# Patient Record
Sex: FEMALE | Race: ASIAN
[De-identification: names, ages, dates, MRNs, and addresses within clinical notes are randomized per-mention and may not be internally consistent; named-entity substitution may affect disease eponyms.]

---

## 2020-01-09 ENCOUNTER — HOSPITAL ENCOUNTER (INPATIENT)
Dept: HOSPITAL 74 - JLDR | Age: 30
LOS: 3 days | Discharge: HOME | End: 2020-01-12
Attending: OBSTETRICS & GYNECOLOGY | Admitting: OBSTETRICS & GYNECOLOGY
Payer: COMMERCIAL

## 2020-01-09 VITALS — BODY MASS INDEX: 29.6 KG/M2

## 2020-01-09 DIAGNOSIS — Z3A.41: ICD-10-CM

## 2020-01-09 DIAGNOSIS — O48.0: Primary | ICD-10-CM

## 2020-01-09 LAB
ANION GAP SERPL CALC-SCNC: 8 MMOL/L (ref 8–16)
APTT BLD: 28.5 SECONDS (ref 25.2–36.5)
BASOPHILS # BLD: 0.3 % (ref 0–2)
BUN SERPL-MCNC: 4.8 MG/DL (ref 7–18)
CALCIUM SERPL-MCNC: 8.6 MG/DL (ref 8.5–10.1)
CHLORIDE SERPL-SCNC: 107 MMOL/L (ref 98–107)
CO2 SERPL-SCNC: 22 MMOL/L (ref 21–32)
CREAT SERPL-MCNC: 0.6 MG/DL (ref 0.55–1.3)
DEPRECATED RDW RBC AUTO: 18.7 % (ref 11.6–15.6)
EOSINOPHIL # BLD: 1.4 % (ref 0–4.5)
GLUCOSE SERPL-MCNC: 97 MG/DL (ref 74–106)
HCT VFR BLD CALC: 37.5 % (ref 32.4–45.2)
HGB BLD-MCNC: 12.6 GM/DL (ref 10.7–15.3)
INR BLD: 1.05 (ref 0.83–1.09)
LYMPHOCYTES # BLD: 25.3 % (ref 8–40)
MCH RBC QN AUTO: 28.4 PG (ref 25.7–33.7)
MCHC RBC AUTO-ENTMCNC: 33.6 G/DL (ref 32–36)
MCV RBC: 84.4 FL (ref 80–96)
MONOCYTES # BLD AUTO: 9 % (ref 3.8–10.2)
NEUTROPHILS # BLD: 64 % (ref 42.8–82.8)
PLATELET # BLD AUTO: 228 K/MM3 (ref 134–434)
PMV BLD: 7.4 FL (ref 7.5–11.1)
POTASSIUM SERPLBLD-SCNC: 3.6 MMOL/L (ref 3.5–5.1)
PT PNL PPP: 12.4 SEC (ref 9.7–13)
RBC # BLD AUTO: 4.44 M/MM3 (ref 3.6–5.2)
SODIUM SERPL-SCNC: 137 MMOL/L (ref 136–145)
WBC # BLD AUTO: 5.5 K/MM3 (ref 4–10)

## 2020-01-09 PROCEDURE — 3E0P7VZ INTRODUCTION OF HORMONE INTO FEMALE REPRODUCTIVE, VIA NATURAL OR ARTIFICIAL OPENING: ICD-10-PCS | Performed by: OBSTETRICS & GYNECOLOGY

## 2020-01-09 RX ADMIN — SODIUM CHLORIDE, SODIUM GLUCONATE, SODIUM ACETATE, POTASSIUM CHLORIDE, AND MAGNESIUM CHLORIDE SCH MLS/HR: 526; 502; 368; 37; 30 INJECTION, SOLUTION INTRAVENOUS at 20:45

## 2020-01-09 NOTE — HP
Past Medical History





- Primary Care Physician


PCP:: Scooter Pires





- Admission


Chief Complaint: pregnancy 41 weeks, for induction of labor


History of Present Illness: 





28 yo f  0 1 0 41 weeks admitted for cervidil induction of labor , cx ft, 

25 vx -3 mi, fhr cat 1, no contraction, cervidil risks has explained to patient


History Source: Patient


Limitations to Obtaining History: No Limitations





- Past Medical History


...: 2


...Para: 0


...Term: 0


...: 0


...Spon : 1


...Induced : 0


...Multiple Gestation: 0


...LMP: 19


... Weeks Gestation by Dates: 41.2


...EDC by Dates: 19


...EDC by Sono: 20





- Past Surgical History


Hx Myomectomy: No


Hx Transabdominal Cerclage: No





- Smoking History


Smoking history: Never smoked


Have you smoked in the past 12 months: No





- Alcohol/Substance Use


Hx Alcohol Use: No





- Social History


Usual Living Arrangement: Yes: With Spouse


History of Recent Travel: No





Home Medications





- Allergies


Allergies/Adverse Reactions: 


 Allergies











Allergy/AdvReac Type Severity Reaction Status Date / Time


 


nitrofurantoin Allergy Severe Difficulty Verified 19 14:19





[From Macrobid]   Breathing  














- Home Medications


Home Medications: 


Ambulatory Orders





Pnv No.95/Ferrous Fum/Folic AC [Prenatal Formula] 1 each PO DAILY 19 











Review of Systems





- Review of Systems


Constitutional: reports: No Symptoms


Eyes: reports: No Symptoms


HENT: reports: No Symptoms


Neck: reports: No Symptoms


Cardiovascular: reports: No Symptoms


Respiratory: reports: No Symptoms


Gastrointestinal: reports: No Symptoms


Genitourinary: reports: No Symptoms


Breasts: reports: No Symptoms Reported


Musculoskeletal: reports: No Symptoms


Integumentary: reports: No Symptoms


Neurological: reports: No Symptoms


Endocrine: reports: No Symptoms


Hematology/Lymphatic: reports: No Symptoms


Psychiatric: reports: No Symptoms





Physical Exam - Maternity


Vital Signs: 


 Vital Signs











Temperature  98.0 F   20 18:00


 


Pulse Rate  70   20 20:00


 


Respiratory Rate  20   20 20:00


 


Blood Pressure  111/60   20 20:00


 


O2 Sat by Pulse Oximetry (%)      











Constitutional: Yes: Well Nourished, No Distress, Calm


Eyes: Yes: WNL, Conjunctiva Clear, EOM Intact


HENT: Yes: WNL, Atraumatic, Normocephalic


Neck: Yes: WNL, Supple, Trachea Midline


Cardiovascular: Yes: WNL, Regular Rate and Rhythm


Breast(s): Yes: WNL





- Abdominal Exam/OB


Fundal Height: 40


Number of Fetuses: Single


Fetal Presentation: Vertex


Contractions: No


Intensity: Unaware


Fetal Monitor Mode: External


Fetal Heart Rate Location: [facility]


Category: I


Accelerations: Non-Uniform


Decelerations: None





- Vaginal Exam/OB


Vaginal Bleediing: No


Speculum Exam: No


Dilatation (cm): 1


Effacement (%): 25


Amniotic Membrane Status: Intact


Fetal Presentation: Vertex/Position


Fetal Station: -3





- Physical Exam


Musculoskeletal: Yes: WNL


Extremities: Yes: WNL


Edema: LLE: Trace, RLE: Trace


Deep Tendon Reflex Grade: Normal +2


Psychiatric: Yes: Alert





- Labs


Lab Results: 


 CBC, BMP





 20 09:00 





 20 09:00 











Hemorrhage Risk Assessment





- Risk Factors


Medium Risk Factors: Yes: None


High Risk Factors: Yes: None


Risk Score: 1


Risk Level: Medium Risk





Problem List





- Problems


(1) Post-dates pregnancy


Code(s): O48.0 - POST-TERM PREGNANCY   


Qualifiers: 


   Post-term pregnancy type: 40-42 weeks gestation   Qualified Code(s): O48.0 - 

Post-term pregnancy   





(2) Post-dates pregnancy


Code(s): O48.0 - POST-TERM PREGNANCY   





(3) Encounter for induction of labor


Code(s): Z34.90 - ENCNTR FOR SUPRVSN OF NORMAL PREGNANCY, UNSP, UNSP TRIMESTER 

  





Assessment/Plan





admit for cervidil induction


fhm


GBS positive ,will start when in labor

## 2020-01-09 NOTE — PN
Progress Note (short form)





- Note


Progress Note: 





vcx 2 cm 70 vx -3, fhr cat 1, regular contraction q 2 min , cervidil removed, 


will start GBS prophylaxis





Problem List





- Problems


(1) Post-dates pregnancy


Code(s): O48.0 - POST-TERM PREGNANCY   


Qualifiers: 


   Post-term pregnancy type: 40-42 weeks gestation   Qualified Code(s): O48.0 - 

Post-term pregnancy   





(2) Post-dates pregnancy


Code(s): O48.0 - POST-TERM PREGNANCY   





(3) Encounter for induction of labor


Code(s): Z34.90 - ENCNTR FOR SUPRVSN OF NORMAL PREGNANCY, UNSP, UNSP TRIMESTER

## 2020-01-10 LAB — PH SPEC: (no result) [PH]

## 2020-01-10 RX ADMIN — Medication SCH MLS/HR: at 18:00

## 2020-01-10 RX ADMIN — AMPICILLIN SCH MLS/HR: 1 INJECTION, POWDER, FOR SOLUTION INTRAMUSCULAR; INTRAVENOUS at 09:00

## 2020-01-10 RX ADMIN — ACETAMINOPHEN PRN MG: 325 TABLET ORAL at 23:20

## 2020-01-10 RX ADMIN — FERROUS SULFATE TAB EC 324 MG (65 MG FE EQUIVALENT) SCH MG: 324 (65 FE) TABLET DELAYED RESPONSE at 17:02

## 2020-01-10 RX ADMIN — SODIUM CHLORIDE, SODIUM GLUCONATE, SODIUM ACETATE, POTASSIUM CHLORIDE, AND MAGNESIUM CHLORIDE SCH MLS/HR: 526; 502; 368; 37; 30 INJECTION, SOLUTION INTRAVENOUS at 09:35

## 2020-01-10 RX ADMIN — AMPICILLIN SCH MLS/HR: 1 INJECTION, POWDER, FOR SOLUTION INTRAMUSCULAR; INTRAVENOUS at 05:00

## 2020-01-10 RX ADMIN — Medication SCH MLS/HR: at 12:10

## 2020-01-10 RX ADMIN — IBUPROFEN PRN MG: 600 TABLET, FILM COATED ORAL at 23:20

## 2020-01-10 NOTE — PN
Progress Note (short form)





- Note


Progress Note: 





cx 7 cm ,100 vx -1 AROM, clear, FHR cat 1





Problem List





- Problems


(1) Post-dates pregnancy


Code(s): O48.0 - POST-TERM PREGNANCY   


Qualifiers: 


   Post-term pregnancy type: 40-42 weeks gestation   Qualified Code(s): O48.0 - 

Post-term pregnancy   





(2) Post-dates pregnancy


Code(s): O48.0 - POST-TERM PREGNANCY   





(3) Encounter for induction of labor


Code(s): Z34.90 - ENCNTR FOR SUPRVSN OF NORMAL PREGNANCY, UNSP, UNSP TRIMESTER

## 2020-01-10 NOTE — PN
Progress Note (short form)





- Note


Progress Note: 





full 100 vx 2+ station , fhr cat 2, pushing





Problem List





- Problems


(1) Post-dates pregnancy


Code(s): O48.0 - POST-TERM PREGNANCY   


Qualifiers: 


   Post-term pregnancy type: 40-42 weeks gestation   Qualified Code(s): O48.0 - 

Post-term pregnancy   





(2) Post-dates pregnancy


Code(s): O48.0 - POST-TERM PREGNANCY   





(3) Encounter for induction of labor


Code(s): Z34.90 - ENCNTR FOR SUPRVSN OF NORMAL PREGNANCY, UNSP, UNSP TRIMESTER

## 2020-01-10 NOTE — PN
Progress Note (short form)





- Note


Progress Note: 





cx 6 cm , 80 vx , -2 mi, fhr cat 1contraction regular





Problem List





- Problems


(1) Post-dates pregnancy


Code(s): O48.0 - POST-TERM PREGNANCY   


Qualifiers: 


   Post-term pregnancy type: 40-42 weeks gestation   Qualified Code(s): O48.0 - 

Post-term pregnancy   





(2) Post-dates pregnancy


Code(s): O48.0 - POST-TERM PREGNANCY   





(3) Encounter for induction of labor


Code(s): Z34.90 - ENCNTR FOR SUPRVSN OF NORMAL PREGNANCY, UNSP, UNSP TRIMESTER

## 2020-01-10 NOTE — PN
Delivery





- Delivery


Vaginal Delivery: Spontaneous (cx fullydilated head delivered ,juan carlos, ,ant. post 

shoulder with nodifficulty, live baby girl , apgar 9/9, placenta complete , 

median episiotomy in 3 layers with 2.0 chromic  repaired wit 2.0 chromic , ebl 

300cc , baby bonded with mother and breast fed no complicatin)


Type of Anesthesia: Local, Epidural


Episiotomy/Laceration: Midline


EBL (cc): 300





Delivery, Single Birth





- Stages of Labor


Date 1st Stage Initiatied: 01/10/20


Time 1st Stage Initiated: 04:00


Date 2nd Stage Initiated: 01/10/20


Time 2nd Stage Initiated: 10:45


Date of Delivery: 01/10/20


Time of Delivery: 12:06


Time Placenta Delivered: 12:10





- Condition of Infant


Pediatrician/Neonatologist Present: Yes


Name: Claudine Castrejon


Infant Gender: Female


Birth Weight: 7 lb 8 oz


Position: Left, OA


Total Hours ROM (Hrs/Mins): 7/40





- Apgar


  ** 1 Minute


Apgar Total Score: 9





  ** 5 Minutes


Apgar Total Score: 9





- [place] Feeding Plan


Initial Plan: Exclusive breastfeeding throughout hospitalization

## 2020-01-10 NOTE — PN
Progress Note (short form)





- Note


Progress Note: 





cx 9 cm , 1oo vx -1 mr, fhr cat 1, regullar contraction, wants top off





Problem List





- Problems


(1) Post-dates pregnancy


Code(s): O48.0 - POST-TERM PREGNANCY   


Qualifiers: 


   Post-term pregnancy type: 40-42 weeks gestation   Qualified Code(s): O48.0 - 

Post-term pregnancy   





(2) Post-dates pregnancy


Code(s): O48.0 - POST-TERM PREGNANCY   





(3) Encounter for induction of labor


Code(s): Z34.90 - ENCNTR FOR SUPRVSN OF NORMAL PREGNANCY, UNSP, UNSP TRIMESTER

## 2020-01-11 LAB
BASOPHILS # BLD: 0.3 % (ref 0–2)
DEPRECATED RDW RBC AUTO: 19 % (ref 11.6–15.6)
EOSINOPHIL # BLD: 1.5 % (ref 0–4.5)
HCT VFR BLD CALC: 25.1 % (ref 32.4–45.2)
HGB BLD-MCNC: 8.3 GM/DL (ref 10.7–15.3)
LYMPHOCYTES # BLD: 23.6 % (ref 8–40)
MCH RBC QN AUTO: 28.5 PG (ref 25.7–33.7)
MCHC RBC AUTO-ENTMCNC: 33.2 G/DL (ref 32–36)
MCV RBC: 85.9 FL (ref 80–96)
MONOCYTES # BLD AUTO: 7.2 % (ref 3.8–10.2)
NEUTROPHILS # BLD: 67.4 % (ref 42.8–82.8)
PLATELET # BLD AUTO: 168 K/MM3 (ref 134–434)
PMV BLD: 7.3 FL (ref 7.5–11.1)
RBC # BLD AUTO: 2.92 M/MM3 (ref 3.6–5.2)
WBC # BLD AUTO: 9.7 K/MM3 (ref 4–10)

## 2020-01-11 RX ADMIN — AMPICILLIN SCH: 1 INJECTION, POWDER, FOR SOLUTION INTRAMUSCULAR; INTRAVENOUS at 02:04

## 2020-01-11 RX ADMIN — FERROUS SULFATE TAB EC 324 MG (65 MG FE EQUIVALENT) SCH MG: 324 (65 FE) TABLET DELAYED RESPONSE at 08:00

## 2020-01-11 RX ADMIN — FERROUS SULFATE TAB EC 324 MG (65 MG FE EQUIVALENT) SCH MG: 324 (65 FE) TABLET DELAYED RESPONSE at 17:57

## 2020-01-11 RX ADMIN — IBUPROFEN PRN MG: 600 TABLET, FILM COATED ORAL at 22:05

## 2020-01-11 RX ADMIN — Medication SCH TAB: at 09:57

## 2020-01-11 RX ADMIN — ACETAMINOPHEN PRN MG: 325 TABLET ORAL at 22:06

## 2020-01-11 NOTE — PN
Progress Note (short form)





- Note


Progress Note: 





ppd 1 s/p  , doing well, no excess vaginal bleeding, ambulating, no 

dizziness


 CBC, BMP





 20 06:39 





 20 09:00 





 Last Vital Signs











Temp Pulse Resp BP Pulse Ox


 


 98.2 F   73   18   92/52 L  98 


 


 20 05:48  20 05:48  20 05:48  20 05:48  01/10/20 11:45








abdomen soft, no CVA, uterus firm, non tender 


lochia mild 


no calf tenderness 


impression anemia , asymptomatic, no active vaginal bleeding 


plan iron, vit 





Problem List





- Problems


(1) Post-dates pregnancy


Code(s): O48.0 - POST-TERM PREGNANCY   


Qualifiers: 


   Post-term pregnancy type: 40-42 weeks gestation   Qualified Code(s): O48.0 - 

Post-term pregnancy   





(2) Post-dates pregnancy


Code(s): O48.0 - POST-TERM PREGNANCY   





(3) Encounter for induction of labor


Code(s): Z34.90 - ENCNTR FOR SUPRVSN OF NORMAL PREGNANCY, UNSP, UNSP TRIMESTER

## 2020-01-12 VITALS — DIASTOLIC BLOOD PRESSURE: 64 MMHG | HEART RATE: 76 BPM | TEMPERATURE: 98.4 F | SYSTOLIC BLOOD PRESSURE: 110 MMHG

## 2020-01-12 RX ADMIN — FERROUS SULFATE TAB EC 324 MG (65 MG FE EQUIVALENT) SCH MG: 324 (65 FE) TABLET DELAYED RESPONSE at 10:54

## 2020-01-12 RX ADMIN — Medication SCH TAB: at 10:54

## 2020-01-12 NOTE — DS
Physical Exam-GYN


Vital Signs: 


 Vital Signs











Temperature  98.1 F   20 22:00


 


Pulse Rate  78   20 22:00


 


Respiratory Rate  18   20 22:00


 


Blood Pressure  106/67   20 22:00


 


O2 Sat by Pulse Oximetry (%)  98   01/10/20 11:45











Constitutional: Yes: Well Nourished, No Distress, Calm


Eyes: Yes: WNL, Conjunctiva Clear, EOM Intact


HENT: Yes: WNL, Atraumatic, Normocephalic


Neck: Yes: WNL, Supple, Trachea Midline


Cardiovascular: Yes: WNL, Regular Rate and Rhythm


Respiratory: Yes: WNL, Regular, CTA Bilaterally


Gastrointestinal: Yes: WNL


...Rectal Exam: Yes: WNL


Renal/: Yes: WNL


....Post Partum: Yes: Uterus firm, Uterus non-tender, Slight lochia rubra


Breast(s): Yes: WNL


Musculoskeletal: Yes: WNL


Extremities: Yes: WNL


Edema: No


Integumentary: Yes: WNL


Neurological: Yes: WNL, Alert, Oriented


...Motor Strength: WNL


Psychiatric: Yes: WNL, Alert, Oriented


Labs: 


 CBC, BMP





 20 06:39 





 20 09:00 











Delivery





- Delivery


Vaginal Delivery: Spontaneous (cx fullydilated head delivered ,juan carlos, ,ant. post 

shoulder with nodifficulty, live baby girl , apgar 9/9, placenta complete , 

median episiotomy in 3 layers with 2.0 chromic  repaired wit 2.0 chromic , ebl 

300cc , baby bonded with mother and breast fed no complicatin)


Type of Anesthesia: Local, Epidural


Episiotomy/Laceration: Midline


EBL (cc): 300





Delivery, Single Birth





- Stages of Labor


Date 1st Stage Initiatied: 01/10/20


Time 1st Stage Initiated: 04:00


Date 2nd Stage Initiated: 01/10/20


Time 2nd Stage Initiated: 10:45


Date of Delivery: 01/10/20


Time of Delivery: 12:06


Time Placenta Delivered: 12:10


Placenta: Yes: Spontaneous





- Condition of Infant


Pediatrician/Neonatologist Present: Yes


Name: Claudine Castrejon


Infant Gender: Female


Birth Weight: 7 lb 8 oz


Position: Left, OA


Total Hours ROM (Hrs/Mins): 7/40





- Apgar


  ** 1 Minute


Apgar Total Score: 9





  ** 5 Minutes


Apgar Total Score: 9





- [place] Feeding Plan


Initial Plan: Exclusive breastfeeding throughout hospitalization





Discharge Summary


Problems reviewed: Yes


Reason For Visit: INDUCTION OF LABOR


Current Active Problems





Encounter for induction of labor (Acute)


Post-dates pregnancy (Acute)


Post-dates pregnancy (Acute)








Procedures: Principal: 


Other Procedures: median episiotomy


Hospital Course: 


no complication


Health Concerns: 


anemia


Plan of Treatment: 


iron, vit


Goals: 


hb 12


Condition: Good





- Instructions


Diet, Activity, Other Instructions: 


regular diet, no intercourse , if pain, heavy vaginal bleeding, fever , 

dizziness call MD 


follow up office 4 weeks


Referrals: 


Scooter Pires MD [Staff Physician] - 


Disposition: HOME





- Home Medications


Comprehensive Discharge Medication List: 


Ambulatory Orders





Pnv No.95/Ferrous Fum/Folic AC [Prenatal Formula] 1 each PO DAILY 19 


Ibuprofen [Motrin -] 600 mg PO QID #28 tablet 20

## 2020-01-16 ENCOUNTER — HOSPITAL ENCOUNTER (EMERGENCY)
Dept: HOSPITAL 74 - JER | Age: 30
Discharge: HOME | End: 2020-01-16
Payer: COMMERCIAL

## 2020-01-16 VITALS — DIASTOLIC BLOOD PRESSURE: 91 MMHG | HEART RATE: 76 BPM | SYSTOLIC BLOOD PRESSURE: 115 MMHG

## 2020-01-16 VITALS — TEMPERATURE: 98.4 F

## 2020-01-16 VITALS — BODY MASS INDEX: 30.2 KG/M2

## 2020-01-16 DIAGNOSIS — R10.2: ICD-10-CM

## 2020-01-16 LAB
ALBUMIN SERPL-MCNC: 3 G/DL (ref 3.4–5)
ALP SERPL-CCNC: 171 U/L (ref 45–117)
ALT SERPL-CCNC: 43 U/L (ref 13–61)
ANION GAP SERPL CALC-SCNC: 7 MMOL/L (ref 8–16)
APTT BLD: 29.3 SECONDS (ref 25.2–36.5)
AST SERPL-CCNC: 36 U/L (ref 15–37)
BASOPHILS # BLD: 0.4 % (ref 0–2)
BILIRUB SERPL-MCNC: 0.3 MG/DL (ref 0.2–1)
BUN SERPL-MCNC: 15 MG/DL (ref 7–18)
CALCIUM SERPL-MCNC: 9.2 MG/DL (ref 8.5–10.1)
CHLORIDE SERPL-SCNC: 106 MMOL/L (ref 98–107)
CO2 SERPL-SCNC: 25 MMOL/L (ref 21–32)
CREAT SERPL-MCNC: 0.6 MG/DL (ref 0.55–1.3)
DEPRECATED RDW RBC AUTO: 18.8 % (ref 11.6–15.6)
EOSINOPHIL # BLD: 2.6 % (ref 0–4.5)
GLUCOSE SERPL-MCNC: 97 MG/DL (ref 74–106)
HCT VFR BLD CALC: 33.1 % (ref 32.4–45.2)
HGB BLD-MCNC: 11.1 GM/DL (ref 10.7–15.3)
INR BLD: 1.06 (ref 0.83–1.09)
LYMPHOCYTES # BLD: 21.6 % (ref 8–40)
MCH RBC QN AUTO: 28.7 PG (ref 25.7–33.7)
MCHC RBC AUTO-ENTMCNC: 33.6 G/DL (ref 32–36)
MCV RBC: 85.5 FL (ref 80–96)
MONOCYTES # BLD AUTO: 7.1 % (ref 3.8–10.2)
NEUTROPHILS # BLD: 68.3 % (ref 42.8–82.8)
PLATELET # BLD AUTO: 326 K/MM3 (ref 134–434)
PMV BLD: 7.3 FL (ref 7.5–11.1)
POTASSIUM SERPLBLD-SCNC: 4.4 MMOL/L (ref 3.5–5.1)
PROT SERPL-MCNC: 7 G/DL (ref 6.4–8.2)
PT PNL PPP: 12.5 SEC (ref 9.7–13)
RBC # BLD AUTO: 3.87 M/MM3 (ref 3.6–5.2)
SODIUM SERPL-SCNC: 138 MMOL/L (ref 136–145)
WBC # BLD AUTO: 7.3 K/MM3 (ref 4–10)

## 2020-01-16 PROCEDURE — 3E033NZ INTRODUCTION OF ANALGESICS, HYPNOTICS, SEDATIVES INTO PERIPHERAL VEIN, PERCUTANEOUS APPROACH: ICD-10-PCS

## 2020-01-16 PROCEDURE — 3E033GC INTRODUCTION OF OTHER THERAPEUTIC SUBSTANCE INTO PERIPHERAL VEIN, PERCUTANEOUS APPROACH: ICD-10-PCS

## 2020-01-16 NOTE — PDOC
Attending Attestation





- Resident


Resident Name: Joan Kay





- ED Attending Attestation


I have performed the following: I have examined & evaluated the patient, The 

case was reviewed & discussed with the resident, I agree w/resident's findings 

& plan





- HPI


HPI: 





01/16/20 04:27


see resident hpi





- Physicial Exam


PE: 





01/16/20 04:27


agree with resident exam





- Medical Decision Making





01/16/20 04:27


29-year-old female, 6 days postpartum status post normal spontaneous vaginal 

delivery requiring episiotomy now with sudden onset of severe uterine cramping 

and bleeding


This was immediately following breast-feeding


Case discussed with Dr. Pope covering for patient's OB/GYN who states 

symptoms likely due to oxytocin release


Hemoglobin and hematocrit are appropriately increased since delivery


Patient has been normotensive


After morphine 4 mg she is resting comfortably and has had no hemorrhage


There were some fresh clots at the vaginal opening


She was able to urinate without difficulty


Plan for DC with outpatient office follow-up


NSAIDs for pain

## 2020-01-16 NOTE — PDOC
History of Present Illness





- General


Stated Complaint: ABD PAIN


Time Seen by Provider: 01/16/20 02:31





- History of Present Illness


Initial Comments: 


Jeanen Greenberg is a 30yo otherwise healthy woman, currently 6 days postpartum s/p 

vaginal delivery who presents with acute onset of vaginal bleeding and severe 

abdominal pain. She states that she had been feeling well throughout the day, 

got up overnight and used the bathroom. Immediately afterward, she had sudden 

onset of severe lower abdominal pain and started to have vaginal bleeding with 

passage of two clots. Ms Greenberg is tearful and unable to provide much 

information. Her  and aunt are at bedside to provide additional 

information.








Past History





- Past Medical History


Allergies/Adverse Reactions: 


 Allergies











Allergy/AdvReac Type Severity Reaction Status Date / Time


 


nitrofurantoin Allergy Severe Difficulty Verified 01/16/20 03:26





[From Macrobid]   Breathing  











Home Medications: 


Ambulatory Orders





Pnv No.95/Ferrous Fum/Folic AC [Prenatal Formula] 1 each PO DAILY 09/26/19 


Ibuprofen [Motrin -] 600 mg PO QID #28 tablet 01/11/20 








Asthma: No


Cancer: No


Cardiac Disorders: No


Diabetes: No


HTN: No


Seizures: No


Thyroid Disease: No





- Psycho Social/Smoking Cessation Hx


Smoking History: Never smoked


Have you smoked in the past 12 months: No


Hx Alcohol Use: No


Drug/Substance Use Hx: No


Hx Substance Use Treatment: No





**Review of Systems





- Review of Systems


Comments:: 


Unable to provide


Per family, no complaints prior to tonight





*Physical Exam





- Physical Exam


General: Very uncomfortable. Vitals stable, no acute distress


HEENT: Atraumatic, PERRL, EOMI, MMM, voice normal


Cards: RRR, no murmur appreciated


Pulm: Comfortable on room air, clear to auscultation bilaterally


Abd: Soft, nondistended. No rigidity. Pt clutching lower abdomen but no 

involuntary guarding observed


: External vaginal exam with approx 15-20cc of dark red blood. Internal exam 

deferred due to episiotomy and recent delivery


Ext: Atraumatic. No LE edema. ROM intact. WWP


Skin: Normal color, no rashes or lesions


Neuro: A&Ox3, CN grossly intact, normal speech, motor/sensory grossly intact 

and symmetric


Psych: Upset, tearful











ED Treatment Course





- LABORATORY


CBC & Chemistry Diagram: 


 01/16/20 02:34





 01/16/20 02:34





Medical Decision Making





- Medical Decision Making





01/16/20 02:36


Jeanne Greenberg is a 30yo otherwise healthy woman, currently 6 days postpartum s/p 

vaginal delivery who presents with acute onset of vaginal bleeding and severe 

abdominal pain. She states that she had been feeling well throughout the day, 

got up overnight and used the bathroom. Immediately afterward, she had sudden 

onset of severe lower abdominal pain and started to have vaginal bleeding with 

passage of two clots. Ms Greenberg is tearful and unable to provide much 

information. Her  and aunt are at bedside to provide additional 

information.





- Dark red vaginal bleeding


- Pt clutching abdomen, resists exam. Appears to be in significant pain.


- CBC, CMP, coags, T&S


- IVF


- Morphine, zofran


- OB consult; pt sees dr Pires





01/16/20 03:40


- Call to Dr Pope, spoke to Dr Xie. Per request of Dr Pope, 

confirmed that pt  her baby immediately before the pain started. She 

reports that breastfeeding could have caused oxytocin release, stimulating a 

uterine contraction and expelling old blood from within the uterus. Would like 

to be called back with results. Agrees with hydration, checking h/h, pain 

control


- Pt now sleeping comfortably





01/16/20 03:49


- Labs reviewed. Hgb increassed from 8.3 following delivery on 1/10 to 11.1 

today. Appropraite increase. No concerning abnormalities


- No active bleeding, small amount of dark red blood noted


- Pt continues to sleep comfortably


- Will reassess.





01/16/20 05:13


- Pt declines acetaminiophen and toradol, states pain is nearly gone


- Spoke to Dr Pope again. Recommending alternating acetaminophen and 

ibuprofen at home


- Pt will call tomorrow to schedule follow up with Dr Pires








Seen with Dr Rojas Kay


PGY2





Discharge





- Discharge Information


Problems reviewed: Yes


Clinical Impression/Diagnosis: 


 Vaginal bleeding





Abdominal pain


Qualifiers:


 Abdominal location: lower abdomen, unspecified Qualified Code(s): R10.30 - 

Lower abdominal pain, unspecified





Condition: Stable


Disposition: HOME





- Admission


No





- Follow up/Referral


Referrals: 


Naomi Hodgson MD [Primary Care Provider] - 





- Patient Discharge Instructions


Additional Instructions: 


Discharge Instructions:


You were seen in the emergency department for abdominal pain and vaginal 

bleeding. This is likely due to normal post-pregnancy hormone production. The 

bleeding is probably blood that was inside the uterus being expelled by muscle 

contraction. 





Home Care and Follow Up:


- You may use over the counter medications as needed for pain at home. 650-

1000mg acetaminophen (Tylenol) or 600mg ibuprofen (Motrin or Advil) can be used 

every 6-8 hours. If needed for continued pain, these medications may be 

alternated every 3-4 hours. For example, if you take ibuprofen at 9am, you may 

take acetaminophen at noon, ibuprofen at 3pm, etc. 


- It is strongly recommended that you take ibuprofen with food to help prevent 

stomach irritation. 


- Try using an ice pack for 20 minutes every hour or a heating pad for 

additional pain control. These should NOT be used over the lidocaine patch, but 

you may place them over the areas of pain while the patch is off.


- Do not stop moving around. As much as you can tolerate, continue to do light 

exercise and stretching exercises. Increase your activity level as much as you 

can tolerate daily.


- Call your OB tomorrow to schedule a follow up appointment


- Seek immediate medical care if you have significant worsening of your symptoms

, heavy vaginal bleeding, you become dizzy or faint, you have difficulty 

breathing, or any other medical emergency. 








- Post Discharge Activity


Work/Back to School Note:  Parent(s) Back to Work Note

## 2021-06-28 ENCOUNTER — HOSPITAL ENCOUNTER (EMERGENCY)
Dept: HOSPITAL 74 - JER | Age: 31
LOS: 1 days | Discharge: HOME | End: 2021-06-29
Payer: COMMERCIAL

## 2021-06-28 VITALS — TEMPERATURE: 97.8 F | DIASTOLIC BLOOD PRESSURE: 84 MMHG | HEART RATE: 75 BPM | SYSTOLIC BLOOD PRESSURE: 131 MMHG

## 2021-06-28 VITALS — BODY MASS INDEX: 25.1 KG/M2

## 2021-06-28 DIAGNOSIS — Z3A.20: ICD-10-CM

## 2021-06-28 DIAGNOSIS — O26.829: Primary | ICD-10-CM

## 2021-06-28 LAB
ALBUMIN SERPL-MCNC: 3.2 G/DL (ref 3.4–5)
ALP SERPL-CCNC: 57 U/L (ref 45–117)
ALT SERPL-CCNC: 14 U/L (ref 13–61)
ANION GAP SERPL CALC-SCNC: 9 MMOL/L (ref 8–16)
APPEARANCE UR: CLEAR
AST SERPL-CCNC: 11 U/L (ref 15–37)
BACTERIA # UR AUTO: 54 /UL (ref 0–1359)
BASOPHILS # BLD: 0.4 % (ref 0–2)
BILIRUB SERPL-MCNC: 0.2 MG/DL (ref 0.2–1)
BILIRUB UR STRIP.AUTO-MCNC: NEGATIVE MG/DL
BUN SERPL-MCNC: 9.2 MG/DL (ref 7–18)
CALCIUM SERPL-MCNC: 8.3 MG/DL (ref 8.5–10.1)
CASTS URNS QL MICRO: 0 /UL (ref 0–3.1)
CHLORIDE SERPL-SCNC: 107 MMOL/L (ref 98–107)
CO2 SERPL-SCNC: 22 MMOL/L (ref 21–32)
COLOR UR: YELLOW
CREAT SERPL-MCNC: 0.5 MG/DL (ref 0.55–1.3)
DEPRECATED RDW RBC AUTO: 15.5 % (ref 11.6–15.6)
EOSINOPHIL # BLD: 1.8 % (ref 0–4.5)
EPITH CASTS URNS QL MICRO: 5 /UL (ref 0–25.1)
GLUCOSE SERPL-MCNC: 85 MG/DL (ref 74–106)
HCT VFR BLD CALC: 35 % (ref 32.4–45.2)
HGB BLD-MCNC: 11.6 GM/DL (ref 10.7–15.3)
KETONES UR QL STRIP: NEGATIVE
LEUKOCYTE ESTERASE UR QL STRIP.AUTO: NEGATIVE
LYMPHOCYTES # BLD: 23.7 % (ref 8–40)
MCH RBC QN AUTO: 28.5 PG (ref 25.7–33.7)
MCHC RBC AUTO-ENTMCNC: 33.2 G/DL (ref 32–36)
MCV RBC: 85.8 FL (ref 80–96)
MONOCYTES # BLD AUTO: 7.2 % (ref 3.8–10.2)
NEUTROPHILS # BLD: 66.9 % (ref 42.8–82.8)
NITRITE UR QL STRIP: NEGATIVE
PH UR: 7 [PH] (ref 5–8)
PLATELET # BLD AUTO: 241 10^3/UL (ref 134–434)
PMV BLD: 7.3 FL (ref 7.5–11.1)
PROT SERPL-MCNC: 6.9 G/DL (ref 6.4–8.2)
PROT UR QL STRIP: NEGATIVE
PROT UR QL STRIP: NEGATIVE
RBC # BLD AUTO: 31 /UL (ref 0–23.9)
RBC # BLD AUTO: 4.08 M/MM3 (ref 3.6–5.2)
SODIUM SERPL-SCNC: 138 MMOL/L (ref 136–145)
SP GR UR: 1 (ref 1.01–1.03)
UROBILINOGEN UR STRIP-MCNC: 0.2 MG/DL (ref 0.2–1)
WBC # BLD AUTO: 9.5 K/MM3 (ref 4–10)
WBC # UR AUTO: 4 /UL (ref 0–25.8)

## 2021-11-18 ENCOUNTER — HOSPITAL ENCOUNTER (INPATIENT)
Dept: HOSPITAL 74 - JLDR | Age: 31
LOS: 2 days | Discharge: HOME | End: 2021-11-20
Attending: OBSTETRICS & GYNECOLOGY | Admitting: OBSTETRICS & GYNECOLOGY
Payer: COMMERCIAL

## 2021-11-18 VITALS — BODY MASS INDEX: 31.1 KG/M2

## 2021-11-18 DIAGNOSIS — Z3A.40: ICD-10-CM

## 2021-11-18 DIAGNOSIS — O48.0: Primary | ICD-10-CM

## 2021-11-18 DIAGNOSIS — B95.1: ICD-10-CM

## 2021-11-18 DIAGNOSIS — R06.02: ICD-10-CM

## 2021-11-18 LAB
APTT BLD: 23.9 SECONDS (ref 25.2–36.5)
HIV 1+2 AB+HIV1 P24 AG SERPL QL IA: NEGATIVE
INR BLD: 1.13 (ref 0.83–1.09)
PT PNL PPP: 13.2 SEC (ref 9.7–13)

## 2021-11-18 PROCEDURE — 10907ZC DRAINAGE OF AMNIOTIC FLUID, THERAPEUTIC FROM PRODUCTS OF CONCEPTION, VIA NATURAL OR ARTIFICIAL OPENING: ICD-10-PCS | Performed by: OBSTETRICS & GYNECOLOGY

## 2021-11-18 PROCEDURE — 3E0P7VZ INTRODUCTION OF HORMONE INTO FEMALE REPRODUCTIVE, VIA NATURAL OR ARTIFICIAL OPENING: ICD-10-PCS | Performed by: OBSTETRICS & GYNECOLOGY

## 2021-11-18 RX ADMIN — SODIUM CHLORIDE, SODIUM LACTATE, POTASSIUM CHLORIDE, CALCIUM CHLORIDE AND DEXTROSE MONOHYDRATE SCH MLS/HR: 5; 600; 310; 30; 20 INJECTION, SOLUTION INTRAVENOUS at 15:41

## 2021-11-18 RX ADMIN — BUPIVACAINE HYDROCHLORIDE SCH ML: 7.5 INJECTION, SOLUTION EPIDURAL; RETROBULBAR at 22:25

## 2021-11-18 RX ADMIN — SODIUM CHLORIDE, SODIUM LACTATE, POTASSIUM CHLORIDE, CALCIUM CHLORIDE AND DEXTROSE MONOHYDRATE SCH MLS/HR: 5; 600; 310; 30; 20 INJECTION, SOLUTION INTRAVENOUS at 08:00

## 2021-11-19 LAB
ALBUMIN SERPL-MCNC: 2.1 G/DL (ref 3.4–5)
ALP SERPL-CCNC: 186 U/L (ref 45–117)
ALT SERPL-CCNC: 13 U/L (ref 13–61)
ANION GAP SERPL CALC-SCNC: 6 MMOL/L (ref 8–16)
APPEARANCE UR: (no result)
AST SERPL-CCNC: 19 U/L (ref 15–37)
BACTERIA # UR AUTO: 0 /UL (ref 0–1359)
BASE EXCESS BLDCOA CALC-SCNC: -6.1 MMOL/L (ref 0–2)
BASOPHILS # BLD: 0.2 % (ref 0–2)
BILIRUB SERPL-MCNC: 0.7 MG/DL (ref 0.2–1)
BILIRUB UR STRIP.AUTO-MCNC: (no result) MG/DL
BUN SERPL-MCNC: 6.8 MG/DL (ref 7–18)
CALCIUM SERPL-MCNC: 7.7 MG/DL (ref 8.5–10.1)
CASTS URNS QL MICRO: 60 /UL (ref 0–3.1)
CHLORIDE SERPL-SCNC: 110 MMOL/L (ref 98–107)
CO2 SERPL-SCNC: 24 MMOL/L (ref 21–32)
COLOR UR: (no result)
CREAT SERPL-MCNC: 0.7 MG/DL (ref 0.55–1.3)
CRYSTALS URNS QL MICRO: (no result) /HPF
DEPRECATED RDW RBC AUTO: 19.8 % (ref 11.6–15.6)
EOSINOPHIL # BLD: 0 % (ref 0–4.5)
EPITH CASTS URNS QL MICRO: 5 /UL (ref 0–25.1)
GLUCOSE SERPL-MCNC: 94 MG/DL (ref 74–106)
HCO3 BLDCO-SCNC: 20.6 MMHG (ref 20–29)
HCT VFR BLD CALC: 31.4 % (ref 32.4–45.2)
HGB BLD-MCNC: 10.3 GM/DL (ref 10.7–15.3)
KETONES UR QL STRIP: NEGATIVE
LEUKOCYTE ESTERASE UR QL STRIP.AUTO: (no result)
LYMPHOCYTES # BLD: 7.8 % (ref 8–40)
MCH RBC QN AUTO: 23.8 PG (ref 25.7–33.7)
MCHC RBC AUTO-ENTMCNC: 32.9 G/DL (ref 32–36)
MCV RBC: 72.3 FL (ref 80–96)
MONOCYTES # BLD AUTO: 6.9 % (ref 3.8–10.2)
NEUTROPHILS # BLD: 85.1 % (ref 42.8–82.8)
NITRITE UR QL STRIP: POSITIVE
PCO2 BLDCO: 45 MMHG (ref 30–78)
PH BLDCO: 7.28 [PH] (ref 7.14–7.44)
PH UR: 7 [PH] (ref 5–8)
PLATELET # BLD AUTO: 211 10^3/UL (ref 134–434)
PMV BLD: 7.5 FL (ref 7.5–11.1)
PROT SERPL-MCNC: 5.7 G/DL (ref 6.4–8.2)
PROT UR QL STRIP: (no result)
PROT UR QL STRIP: NEGATIVE
RBC # BLD AUTO: (no result) /UL (ref 0–23.9)
RBC # BLD AUTO: 4.35 M/MM3 (ref 3.6–5.2)
SODIUM SERPL-SCNC: 140 MMOL/L (ref 136–145)
SP GR UR: 1.01 (ref 1.01–1.03)
UROBILINOGEN UR STRIP-MCNC: 0.2 MG/DL (ref 0.2–1)
WBC # BLD AUTO: 13.4 K/MM3 (ref 4–10)
WBC # UR AUTO: 79 /UL (ref 0–25.8)

## 2021-11-19 PROCEDURE — 0HQ9XZZ REPAIR PERINEUM SKIN, EXTERNAL APPROACH: ICD-10-PCS | Performed by: OBSTETRICS & GYNECOLOGY

## 2021-11-19 RX ADMIN — IBUPROFEN PRN MG: 600 TABLET, FILM COATED ORAL at 20:05

## 2021-11-19 RX ADMIN — FERROUS SULFATE TAB EC 324 MG (65 MG FE EQUIVALENT) SCH MG: 324 (65 FE) TABLET DELAYED RESPONSE at 21:22

## 2021-11-19 RX ADMIN — AMPICILLIN SCH: 1 INJECTION, POWDER, FOR SOLUTION INTRAMUSCULAR; INTRAVENOUS at 17:30

## 2021-11-19 RX ADMIN — FERROUS SULFATE TAB EC 324 MG (65 MG FE EQUIVALENT) SCH MG: 324 (65 FE) TABLET DELAYED RESPONSE at 12:46

## 2021-11-19 RX ADMIN — BUPIVACAINE HYDROCHLORIDE SCH ML: 7.5 INJECTION, SOLUTION EPIDURAL; RETROBULBAR at 01:15

## 2021-11-19 RX ADMIN — IBUPROFEN PRN MG: 600 TABLET, FILM COATED ORAL at 12:46

## 2021-11-19 RX ADMIN — Medication SCH TAB: at 12:46

## 2021-11-19 RX ADMIN — AMPICILLIN SCH: 1 INJECTION, POWDER, FOR SOLUTION INTRAMUSCULAR; INTRAVENOUS at 05:47

## 2021-11-19 RX ADMIN — AMPICILLIN SCH GM: 1 INJECTION, POWDER, FOR SOLUTION INTRAMUSCULAR; INTRAVENOUS at 01:15

## 2021-11-20 VITALS — HEART RATE: 81 BPM | DIASTOLIC BLOOD PRESSURE: 67 MMHG | SYSTOLIC BLOOD PRESSURE: 105 MMHG | TEMPERATURE: 98.2 F

## 2021-11-20 LAB
BASOPHILS # BLD: 0.4 % (ref 0–2)
DEPRECATED RDW RBC AUTO: 20 % (ref 11.6–15.6)
EOSINOPHIL # BLD: 1.8 % (ref 0–4.5)
HCT VFR BLD CALC: 25.6 % (ref 32.4–45.2)
HGB BLD-MCNC: 8.4 GM/DL (ref 10.7–15.3)
LYMPHOCYTES # BLD: 33.7 % (ref 8–40)
MCH RBC QN AUTO: 24.1 PG (ref 25.7–33.7)
MCHC RBC AUTO-ENTMCNC: 32.8 G/DL (ref 32–36)
MCV RBC: 73.4 FL (ref 80–96)
MONOCYTES # BLD AUTO: 7.1 % (ref 3.8–10.2)
NEUTROPHILS # BLD: 57 % (ref 42.8–82.8)
PLATELET # BLD AUTO: 178 10^3/UL (ref 134–434)
PMV BLD: 7.6 FL (ref 7.5–11.1)
RBC # BLD AUTO: 3.49 M/MM3 (ref 3.6–5.2)
WBC # BLD AUTO: 7.7 K/MM3 (ref 4–10)

## 2021-11-20 RX ADMIN — FERROUS SULFATE TAB EC 324 MG (65 MG FE EQUIVALENT) SCH MG: 324 (65 FE) TABLET DELAYED RESPONSE at 10:10

## 2021-11-20 RX ADMIN — Medication SCH TAB: at 10:10

## 2021-11-20 RX ADMIN — IBUPROFEN PRN MG: 600 TABLET, FILM COATED ORAL at 10:18
